# Patient Record
Sex: MALE | Race: WHITE | Employment: OTHER | URBAN - METROPOLITAN AREA
[De-identification: names, ages, dates, MRNs, and addresses within clinical notes are randomized per-mention and may not be internally consistent; named-entity substitution may affect disease eponyms.]

---

## 2017-11-16 PROBLEM — N28.1 COMPLEX RENAL CYST: Status: ACTIVE | Noted: 2017-11-16

## 2018-10-05 ENCOUNTER — HOSPITAL ENCOUNTER (OUTPATIENT)
Dept: PHYSICAL THERAPY | Age: 73
Discharge: HOME | End: 2018-10-05
Payer: MEDICARE

## 2018-10-05 PROCEDURE — 97140 MANUAL THERAPY 1/> REGIONS: CPT | Performed by: PHYSICAL THERAPIST

## 2018-10-05 PROCEDURE — G8979 MOBILITY GOAL STATUS: HCPCS | Performed by: PHYSICAL THERAPIST

## 2018-10-05 PROCEDURE — G8978 MOBILITY CURRENT STATUS: HCPCS | Performed by: PHYSICAL THERAPIST

## 2018-10-05 PROCEDURE — 97162 PT EVAL MOD COMPLEX 30 MIN: CPT | Performed by: PHYSICAL THERAPIST

## 2018-10-05 PROCEDURE — 97530 THERAPEUTIC ACTIVITIES: CPT | Performed by: PHYSICAL THERAPIST
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
## 2018-10-08 ENCOUNTER — HOSPITAL ENCOUNTER (OUTPATIENT)
Dept: PHYSICAL THERAPY | Age: 73
Discharge: HOME | End: 2018-10-08
Payer: MEDICARE

## 2018-10-08 PROCEDURE — 97110 THERAPEUTIC EXERCISES: CPT | Performed by: PHYSICAL THERAPIST

## 2018-10-08 PROCEDURE — 97140 MANUAL THERAPY 1/> REGIONS: CPT | Performed by: PHYSICAL THERAPIST

## 2018-10-08 NOTE — PROGRESS NOTES
PT DAILY TREATMENT NOTE     Patient Name: Janine Sánchez  Date:10/8/2018  : 1945  [x]  Patient  Verified  Payor: Colton Points / Plan: [place] MEDICARE PART B / Product Type: Medicare /    In time:1025am  Out time:1144am  Total Treatment Time (min): 79  Total Timed Codes (min): 69  1:1 Treatment Time (min): 33   Visit #: 2 of     Treatment Area:  Other cervical disc degeneration, unspecified cervical region [M50.30]  Other cervical disc degeneration, cervicothoracic region [M50.33]  Other intervertebral disc degeneration, lumbosacral region [M51.37]  Unspecified osteoarthritis, unspecified site [M19.90]    SUBJECTIVE  Pain Level (0-10 scale): 5  Any medication changes, allergies to medications, adverse drug reactions, diagnosis change, or new procedure performed?: [x] No    [] Yes (see summary sheet for update)  Subjective functional status/changes:   [x] No changes reported      OBJECTIVE  Modality rationale: decrease inflammation, decrease pain and increase tissue extensibility to improve the patients ability to perform functional mobility and improve activity  endurance     Min Type Additional Details    [] Estim: []Att   []Unatt        []TENS instruct                  []IFC  []Premod   []NMES                     []Other:  []w/US   []w/ice   []w/heat  Position:  Location:    []  Traction: [] Cervical       []Lumbar                       [] Prone          []Supine                       []Intermittent   []Continuous Lbs:  [] before manual  [] after manual    []  Ultrasound: []Continuous   [] Pulsed                           []1MHz   []3MHz Location:  W/cm2:    []  Iontophoresis with dexamethasone         Location: [] Take home patch   [] In clinic   10 []  Ice     [x]  heat  []  Ice massage Position:SL  Location: To R QL    []  Vasopneumatic Device Pressure:       [] lo [] med [] hi   Temperature: [] lo [] med [] hi   [] Skin assessment post-treatment:  []intact []redness- no adverse reaction []redness - adverse reaction:       59/23 min Therapeutic Exercise:  [] See flow sheet : initiate POC, fit for Heel lift    Rationale: increase ROM, increase strength, improve coordination and increase proprioception to improve the patients ability to perform functional mobility and improve activity  endurance        10 min Manual Therapy:  DTM and TRP R to R QL mm in SL   Rationale: decrease pain, increase ROM, increase tissue extensibility and decrease trigger points to perform functional mobility and improve activity  endurance     min Gait Training:  ___ feet with ___ device on level surfaces with ___ level of assist   Rationale:          x min Patient Education: [x] Review HEP    [] Progressed/Changed HEP based on:   [] positioning   [x] body mechanics   [] transfers   [] heat/ice application        Other Objective/Functional Measures:  initiated POC, pt given heel lift in L shoe and reports decreased pain with therex using lift. Good TA noted but challenged with PPT in seated positon. Pain Level (0-10 scale) post treatment: 2-3    ASSESSMENT/Changes in Function:   increased RB and time needed to complete therex. Pt has more normalized/level pelvis with heel lift in L shoe with improved gait pattern noted. Assess effects of heel lift NV. Pt instructed to gradually acclimate himself to lift if he is unable to tolerate wearing all day. Patient will continue to benefit from skilled PT services to modify and progress therapeutic interventions, address functional mobility deficits, address ROM deficits, address strength deficits, analyze and address soft tissue restrictions, analyze and cue movement patterns, analyze and modify body mechanics/ergonomics, assess and modify postural abnormalities, address imbalance/dizziness and instruct in home and community integration to attain remaining goals.      []  See Plan of Care  []  See progress note/recertification  []  See Discharge Summary         Progress towards goals / Updated goals:  First FU visit     PLAN  []  Upgrade activities as tolerated     []  Continue plan of care  []  Update interventions per flow sheet       []  Discharge due to:_  []  Other:_      Sony Swenson, CHAPIS 10/8/2018  7:40 AM

## 2018-10-11 ENCOUNTER — HOSPITAL ENCOUNTER (OUTPATIENT)
Dept: PHYSICAL THERAPY | Age: 73
Discharge: HOME | End: 2018-10-11
Payer: MEDICARE

## 2018-10-11 PROCEDURE — 97110 THERAPEUTIC EXERCISES: CPT

## 2018-10-11 PROCEDURE — 97140 MANUAL THERAPY 1/> REGIONS: CPT

## 2018-10-11 NOTE — PROGRESS NOTES
PT DAILY TREATMENT NOTE     Patient Name: Lio Quiroz  Date:10/11/2018  : 1945  [x]  Patient  Verified  Payor: Daija Diamond / Plan: [place] MEDICARE PART B / Product Type: Medicare /    In time:1015 am  Out time:1125 am  Total Treatment Time (min): 70  Total Timed Codes (min): 60  1:1 Treatment Time (min): 40   Visit #: 3 of     Treatment Area: Other cervical disc degeneration, unspecified cervical region [M50.30]  Other cervical disc degeneration, cervicothoracic region [M50.33]  Other intervertebral disc degeneration, lumbosacral region [M51.37]  Unspecified osteoarthritis, unspecified site [M19.90]    SUBJECTIVE  Pain Level (0-10 scale): 3-4/10  Any medication changes, allergies to medications, adverse drug reactions, diagnosis change, or new procedure performed?: [x] No    [] Yes (see summary sheet for update)  Subjective functional status/changes:   [x] No changes reported  The heel lift in my left shoe has really helped me.     OBJECTIVE  Modality rationale: decrease inflammation, decrease pain and increase tissue extensibility to improve the patients ability to perform functional mobility and improve activity  endurance     Min Type Additional Details    [] Estim: []Att   []Unatt        []TENS instruct                  []IFC  []Premod   []NMES                     []Other:  []w/US   []w/ice   []w/heat  Position:  Location:    []  Traction: [] Cervical       []Lumbar                       [] Prone          []Supine                       []Intermittent   []Continuous Lbs:  [] before manual  [] after manual    []  Ultrasound: []Continuous   [] Pulsed                           []1MHz   []3MHz Location:  W/cm2:    []  Iontophoresis with dexamethasone         Location: [] Take home patch   [] In clinic   10 []  Ice     [x]  heat  []  Ice massage Position:SL semirecumbent   Location: To R QL    []  Vasopneumatic Device Pressure:       [] lo [] med [] hi   Temperature: [] lo [] med [] hi   [x] Skin assessment post-treatment:  [x]intact []redness- no adverse reaction       []redness - adverse reaction:       50/30 min Therapeutic Exercise:  [x] See flow sheet : TM was supervised with VCs as it was pts first time    Rationale: increase ROM, increase strength, improve coordination and increase proprioception to improve the patients ability to perform functional mobility and improve activity  endurance        10 min Manual Therapy:  DTM and TRP R to R QL mm in SL semirecumbent    Rationale: decrease pain, increase ROM, increase tissue extensibility and decrease trigger points to perform functional mobility and improve activity  endurance     min Gait Training:  ___ feet with ___ device on level surfaces with ___ level of assist   Rationale:          x min Patient Education: [x] Review HEP    [] Progressed/Changed HEP based on:   [] positioning   [x] body mechanics   [] transfers   [] heat/ice application        Other Objective/Functional Measures: Added TM training  Added right hip swings in sagittal plane and right QL stretch on stairs    Added clam shells    Pain Level (0-10 scale) post treatment: 0    ASSESSMENT/Changes in Function:   Pt was only able to tolerate 2.5min on TM at . 6mph on 0 incline with VCs throughout and mild SOB which was recovered after seated rest break. Encouraged pt to up frequency of daily walks to mailbox from 1x a day to 2x daily and he was with verbalized understanding. Spoke with pt about using multiple pillows in bed for semi recumbent positioning as he sleeps in a recliner each night predisposing him to secondary postural impairments. Pt reluctant at first, but seemed more open to the idea after max education provided.     Patient will continue to benefit from skilled PT services to modify and progress therapeutic interventions, address functional mobility deficits, address ROM deficits, address strength deficits, analyze and address soft tissue restrictions, analyze and cue movement patterns, analyze and modify body mechanics/ergonomics, assess and modify postural abnormalities, address imbalance/dizziness and instruct in home and community integration to attain remaining goals. [x]  See Plan of Care  []  See progress note/recertification  []  See Discharge Summary         Progress towards goals / Updated goals: · Short Term Goals: To be accomplished in  1  weeks:  1. PT and pt will develop HEP for self-management of symptoms. -INITIATED 10/11/18     · Long Term Goals: To be accomplished in  10  treatments:  1. Pt will be able to walk for 15 min without pain as a functional indicator of improved activity tolerance . -INITIATED TM PROGRAM 2.5min only on . 6mph-10/11/18  2. Pt will improve core/lumbar strength to 4/5 to be able to perform full bridge to improve postural stability with ADLs  3. Pt will be IND and compliant with HEP for self-management as a functional indicator of improved mobility. 4. Pt will improve FOTO score to at least 47/100 as a functional indicator of improved mobility.      PLAN  []  Upgrade activities as tolerated     [x]  Continue plan of care  []  Update interventions per flow sheet       []  Discharge due to:_  []  Other:_      Shelli Dubose, PT 10/11/2018  7:40 AM

## 2018-10-15 ENCOUNTER — HOSPITAL ENCOUNTER (OUTPATIENT)
Dept: PHYSICAL THERAPY | Age: 73
Discharge: HOME | End: 2018-10-15
Payer: MEDICARE

## 2018-10-15 PROCEDURE — 97140 MANUAL THERAPY 1/> REGIONS: CPT | Performed by: PHYSICAL THERAPIST

## 2018-10-15 PROCEDURE — 97110 THERAPEUTIC EXERCISES: CPT | Performed by: PHYSICAL THERAPIST

## 2018-10-15 NOTE — PROGRESS NOTES
PT DAILY TREATMENT NOTE     Patient Name: Janine Sánchez  Date:10/15/2018  : 1945  [x]  Patient  Verified  Payor: Colton Points / Plan: [place] MEDICARE PART B / Product Type: Medicare /    In time:1230 pm  Out time:140 pm  Total Treatment Time (min):70   Total Timed Codes (min): 60  1:1 Treatment Time (min): 45  Visit #: 4 of     Treatment Area: Other cervical disc degeneration, unspecified cervical region [M50.30]  Other cervical disc degeneration, cervicothoracic region [M50.33]  Other intervertebral disc degeneration, lumbosacral region [M51.37]  Unspecified osteoarthritis, unspecified site [M19.90]    SUBJECTIVE  Pain Level (0-10 scale): 3-410  Any medication changes, allergies to medications, adverse drug reactions, diagnosis change, or new procedure performed?: [x] No    [] Yes (see summary sheet for update)  Subjective functional status/changes:   [x] No changes reported   I need to figure out how to keep the heel lift in my slipper. It works very well.      OBJECTIVE  Modality rationale: decrease inflammation, decrease pain and increase tissue extensibility to improve the patients ability to perform functional mobility and improve activity  endurance     Min Type Additional Details    [] Estim: []Att   []Unatt        []TENS instruct                  []IFC  []Premod   []NMES                     []Other:  []w/US   []w/ice   []w/heat  Position:  Location:    []  Traction: [] Cervical       []Lumbar                       [] Prone          []Supine                       []Intermittent   []Continuous Lbs:  [] before manual  [] after manual    []  Ultrasound: []Continuous   [] Pulsed                           []1MHz   []3MHz Location:  W/cm2:    []  Iontophoresis with dexamethasone         Location: [] Take home patch   [] In clinic   10 []  Ice     [x]  heat  []  Ice massage Position:SL semirecumbent   Location: To R QL    []  Vasopneumatic Device Pressure:       [] lo [] med [] hi   Temperature: [] lo [] med [] hi   [x] Skin assessment post-treatment:  [x]intact []redness- no adverse reaction       []redness - adverse reaction:       45/30 min Therapeutic Exercise:  [x] See flow sheet :    Rationale: increase ROM, increase strength, improve coordination and increase proprioception to improve the patients ability to perform functional mobility and improve activity  endurance        15 min Manual Therapy:  DTM and TRP R l/s ps  And to R QL mm in SL    Rationale: decrease pain, increase ROM, increase tissue extensibility and decrease trigger points to perform functional mobility and improve activity  endurance     min Gait Training:  ___ feet with ___ device on level surfaces with ___ level of assist   Rationale:          x min Patient Education: [x] Review HEP    [] Progressed/Changed HEP based on:   [] positioning   [x] body mechanics   [] transfers   [] heat/ice application        Other Objective/Functional Measures:  VC for correct form with QL stretch, no addition to program due to mod fatigue and not feeling well today. SPO2 97%, 82 Bpm    Pain Level (0-10 scale) post treatment: 2    ASSESSMENT/Changes in Function:   Pt had moderate fatigue today and reports he is not feeling up to par. Decreased some therex today and focused on MT DTM of QL mm and TrP. Pt given a new heel lift for use in slippers at home as he reports it really helps his back pain. .       Patient will continue to benefit from skilled PT services to modify and progress therapeutic interventions, address functional mobility deficits, address ROM deficits, address strength deficits, analyze and address soft tissue restrictions, analyze and cue movement patterns, analyze and modify body mechanics/ergonomics, assess and modify postural abnormalities, address imbalance/dizziness and instruct in home and community integration to attain remaining goals.      [x]  See Plan of Care  []  See progress note/recertification  []  See Discharge Summary Progress towards goals / Updated goals: · Short Term Goals: To be accomplished in  1  weeks:  1. PT and pt will develop HEP for self-management of symptoms. -INITIATED 10/11/18     · Long Term Goals: To be accomplished in  10  treatments:  1. Pt will be able to walk for 15 min without pain as a functional indicator of improved activity tolerance . -INITIATED TM PROGRAM 2.5min only on . 6mph-10/11/18  2. Pt will improve core/lumbar strength to 4/5 to be able to perform full bridge to improve postural stability with ADLs  3. Pt will be IND and compliant with HEP for self-management as a functional indicator of improved mobility. Progressing mod compliance 10/15/18  4. Pt will improve FOTO score to at least 47/100 as a functional indicator of improved mobility.      PLAN  []  Upgrade activities as tolerated     [x]  Continue plan of care  []  Update interventions per flow sheet       []  Discharge due to:_  []  Other:_      Ernestina Chapman, PT 10/15/2018  7:40 AM

## 2018-10-18 ENCOUNTER — HOSPITAL ENCOUNTER (OUTPATIENT)
Dept: PHYSICAL THERAPY | Age: 73
Discharge: HOME | End: 2018-10-18
Payer: MEDICARE

## 2018-10-18 PROCEDURE — 97110 THERAPEUTIC EXERCISES: CPT | Performed by: PHYSICAL THERAPIST

## 2018-10-18 PROCEDURE — 97140 MANUAL THERAPY 1/> REGIONS: CPT | Performed by: PHYSICAL THERAPIST

## 2018-10-18 NOTE — PROGRESS NOTES
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 I don't feel as out of breath today.     OBJECTIVE  Modality rationale: decrease inflammation, decrease pain and increase tissue extensibility to improve the patients ability to perform functional mobility and improve activity  endurance     Min Type Additional Details    [] Estim: []Att   []Unatt        []TENS instruct                  []IFC  []Premod   []NMES                     []Other:  []w/US   []w/ice   []w/heat  Position:  Location:    []  Traction: [] Cervical       []Lumbar                       [] Prone          []Supine                       []Intermittent   []Continuous Lbs:  [] before manual  [] after manual    []  Ultrasound: []Continuous   [] Pulsed                           []1MHz   []3MHz Location:  W/cm2:    []  Iontophoresis with dexamethasone         Location: [] Take home patch   [] In clinic   10 []  Ice     [x]  heat  []  Ice massage Position:SL  Location: To R QL    []  Vasopneumatic Device Pressure:       [] lo [] med [] hi   Temperature: [] lo [] med [] hi   [x] Skin assessment post-treatment:  [x]intact []redness- no adverse reaction       []redness - adverse reaction:       65/55 min Therapeutic Exercise:  [x] See flow sheet :      Rationale: increase ROM, increase strength, improve coordination and increase proprioception to improve the patients ability to perform functional mobility and improve activity  endurance        15 min Manual Therapy:  DTM and TRP R l/s ps  And to R QL mm in SL    Rationale: decrease pain, increase ROM, increase tissue extensibility and decrease trigger points to perform functional mobility and improve activity  endurance            x min Patient Education: [x] Review HEP    [] Progressed/Changed HEP based on:   [] positioning   [x] body mechanics   [] transfers   [] heat/ice application        Other Objective/Functional Measures:    continued with TM endurance training: .5mi/hr with cues for upright posture and step length. Added sit to stands from slightly elevated surface for stands without UE A (mod cueing needed for form)    Pain Level (0-10 scale) post treatment: 0    ASSESSMENT/Changes in Function:   Pt continues with sx related to upper l/s degenerative changes, noted TrP in R QL which responds well to TrPR and DTM with MHP. Progressed endurance training and LE strength therex today with good tolerance and good pulse and Spo2 following treatment. Patient will continue to benefit from skilled PT services to modify and progress therapeutic interventions, address functional mobility deficits, address ROM deficits, address strength deficits, analyze and address soft tissue restrictions, analyze and cue movement patterns, analyze and modify body mechanics/ergonomics, assess and modify postural abnormalities, address imbalance/dizziness and instruct in home and community integration to attain remaining goals.      [x]  See Plan of Care  []  See progress note/recertification  []  See Discharge Summary         Progress towards goals / Updated goals: · Short Term Goals: To be accomplished in  1  weeks:  1. PT and pt will develop HEP for self-management of symptoms. -INITIATED 10/11/18     · Long Term Goals: To be accomplished in  10  treatments:  1. Pt will be able to walk for 15 min without pain as a functional indicator of improved activity tolerance . TM PROGRAM 5 min only on . 5mph-10/18/18  2. Pt will improve core/lumbar strength to 4/5 to be able to perform full bridge to improve postural stability with ADLs  3. Pt will be IND and compliant with HEP for self-management as a functional indicator of improved mobility. Progressing mod compliance 10/15/18  4. Pt will improve FOTO score to at least 47/100 as a functional indicator of improved mobility.      PLAN  []  Upgrade activities as tolerated     [x]  Continue plan of care  []  Update interventions per flow sheet       []  Discharge due to:_  []  Other:_      Saul Galeazzi, PT 10/18/2018  7:40 AM

## 2018-10-22 ENCOUNTER — HOSPITAL ENCOUNTER (OUTPATIENT)
Dept: PHYSICAL THERAPY | Age: 73
Discharge: HOME | End: 2018-10-22
Payer: MEDICARE

## 2018-10-22 PROCEDURE — 97140 MANUAL THERAPY 1/> REGIONS: CPT | Performed by: PHYSICAL THERAPIST

## 2018-10-22 PROCEDURE — 97110 THERAPEUTIC EXERCISES: CPT | Performed by: PHYSICAL THERAPIST

## 2018-10-22 NOTE — PROGRESS NOTES
PT DAILY TREATMENT NOTE     Patient Name: Levi Steele  Date:10/22/2018  : 1945  [x]  Patient  Verified  Payor: Trinidad Martínez / Plan: [place] B / Product Type: Medicare /    In time:1230 am  Out time: 140 pm  Total Treatment Time (min):70   Total Timed Codes (min): 55  1:1 Treatment Time (min): 40  Visit #: 6 of     Treatment Area: Other cervical disc degeneration, unspecified cervical region [M50.30]  Other cervical disc degeneration, cervicothoracic region [M50.33]  Other intervertebral disc degeneration, lumbosacral region [M51.37]  Unspecified osteoarthritis, unspecified site [M19.90]    SUBJECTIVE  Pain Level (0-10 scale): 410  Any medication changes, allergies to medications, adverse drug reactions, diagnosis change, or new procedure performed?: [x] No    [] Yes (see summary sheet for update)  Subjective functional status/changes:   [x] No changes reported  I am just not feeling well today. I am having pain on the R when I put weight on the R side.     OBJECTIVE  Modality rationale: decrease inflammation, decrease pain and increase tissue extensibility to improve the patients ability to perform functional mobility and improve activity  endurance     Min Type Additional Details    [] Estim: []Att   []Unatt        []TENS instruct                  []IFC  []Premod   []NMES                     []Other:  []w/US   []w/ice   []w/heat  Position:  Location:    []  Traction: [] Cervical       []Lumbar                       [] Prone          []Supine                       []Intermittent   []Continuous Lbs:  [] before manual  [] after manual    []  Ultrasound: []Continuous   [] Pulsed                           []1MHz   []3MHz Location:  W/cm2:    []  Iontophoresis with dexamethasone         Location: [] Take home patch   [] In clinic   10 []  Ice     [x]  heat  []  Ice massage Position:SL  Location: To R QL    []  Vasopneumatic Device Pressure:       [] lo [] med [] hi   Temperature: [] lo [] med [] hi   [x] Skin assessment post-treatment:  [x]intact []redness- no adverse reaction       []redness - adverse reaction:       45/25 min Therapeutic Exercise:  [x] See flow sheet :      Rationale: increase ROM, increase strength, improve coordination and increase proprioception to improve the patients ability to perform functional mobility and improve activity  endurance        15  min Manual Therapy:  DTM and TRP R l/s ps  And to R QL mm in SL    Rationale: decrease pain, increase ROM, increase tissue extensibility and decrease trigger points to perform functional mobility and improve activity  endurance            x min Patient Education: [x] Review HEP    [] Progressed/Changed HEP based on:   [] positioning   [x] body mechanics   [] transfers   [] heat/ice application        Other Objective/Functional Measures:  Decreased endurance for therex today with c/o R l/s pain with full WB on R LE  continued with TM endurance training: .5mi/hr but decreased tolerance today to 2 min. Progress to table HL next visit for ball bands for eventual progression to bridges    Pain Level (0-10 scale) post treatment: 2-3    ASSESSMENT/Changes in Function:   Presents with decreased endurance compared to last visit. Pt continues sx into the R QL mm with noted TTP/TRP of QL. Pt reports good relief from e-stim in past at Chiropractor, look at possibly adding in E-will at next treatment. Performing HEP sit to stands over the weekend with good compliance per pt report.        Patient will continue to benefit from skilled PT services to modify and progress therapeutic interventions, address functional mobility deficits, address ROM deficits, address strength deficits, analyze and address soft tissue restrictions, analyze and cue movement patterns, analyze and modify body mechanics/ergonomics, assess and modify postural abnormalities, address imbalance/dizziness and instruct in home and community integration to attain remaining goals. [x]  See Plan of Care  []  See progress note/recertification  []  See Discharge Summary         Progress towards goals / Updated goals: · Short Term Goals: To be accomplished in  1  weeks:  1. PT and pt will develop HEP for self-management of symptoms. -INITIATED 10/11/18     · Long Term Goals: To be accomplished in  10  treatments:  1. Pt will be able to walk for 15 min without pain as a functional indicator of improved activity tolerance . TM PROGRAM 5 min only on . 5mph-10/18/18  2. Pt will improve core/lumbar strength to 4/5 to be able to perform full bridge to improve postural stability with ADLs  3. Pt will be IND and compliant with HEP for self-management as a functional indicator of improved mobility. Progressing mod compliance 10/22/18  4. Pt will improve FOTO score to at least 47/100 as a functional indicator of improved mobility.      PLAN  []  Upgrade activities as tolerated     [x]  Continue plan of care  []  Update interventions per flow sheet       []  Discharge due to:_  []  Other:_      Ernestina Chapman, PT 10/22/2018  7:40 AM

## 2018-10-25 ENCOUNTER — HOSPITAL ENCOUNTER (OUTPATIENT)
Dept: PHYSICAL THERAPY | Age: 73
Discharge: HOME | End: 2018-10-25
Payer: MEDICARE

## 2018-10-25 PROCEDURE — 97110 THERAPEUTIC EXERCISES: CPT

## 2018-10-25 PROCEDURE — 97140 MANUAL THERAPY 1/> REGIONS: CPT

## 2018-10-25 NOTE — PROGRESS NOTES
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.37]  Unspecified osteoarthritis, unspecified site [M19.90]    SUBJECTIVE  Pain Level (0-10 scale): 0/10    Any medication changes, allergies to medications, adverse drug reactions, diagnosis change, or new procedure performed?: [x] No    [] Yes (see summary sheet for update)  Subjective functional status/changes:   [] No changes reported  Im feeling much better than when I started with the therapy    OBJECTIVE  Modality rationale: decrease pain and increase tissue extensibility to improve the patients ability to improve functional tolerance    Type Additional Details   [] Estim: []Att   []Unatt  []TENS instruct                 []IFC  []Premod []NMES                       []Other:  []w/US   []w/ice   []w/heat  Position:  Location:   []  Traction: [] Cervical       []Lumbar                       [] Prone          []Supine                       []Intermittent   []Continuous Lbs:  [] before manual  [] after manual   []  Ultrasound: []Continuous   [] Pulsed                           []1MHz   []3MHz Location:  W/cm2:   []  Iontophoresis with dexamethasone         Location: [] Take home patch   [] In clinic   []  Ice     [x]  Heat  15 minutes.    []  Ice massage Position:LEft sidelying   Location:Right side QL and lumbar spine    []  Vasopneumatic Device Pressure: [] lo [] med [] hi   Temp: [] lo [] med [] hi   [x] Skin assessment post-treatment:  []intact []redness- no adverse reaction       []redness - adverse reaction:       50/45 min Therapeutic Exercise:  [x] See flow sheet : 30 min 1:1   Rationale: increase strength to improve the patients ability to improve tolerance to standing       20 min Manual Therapy:  DTM along LS spine and Right QL. TrP release    Rationale: decrease pain, increase ROM and increase tissue extensibility to improve tolerance to standing      min Patient Education: [x] Review HEP    [] Progressed/Changed HEP based on:   [] positioning   [] body mechanics   [] transfers   [] heat/ice application        Other Objective/Functional Measures:   Continued palpable TrP in Right QL. Decreased VCs needed for therex. Pain Level (0-10 scale) post treatment:  0/10    ASSESSMENT/Changes in Function:   Patient is progressing with improved tolerance to activity, decreased reports of pain and improved form and control with therex. Patient will continue to benefit from skilled PT services to modify and progress therapeutic interventions, address functional mobility deficits, address strength deficits and analyze and address soft tissue restrictions to attain remaining goals. []  See Plan of Care  []  See progress note/recertification  []  See Discharge Summary         Progress towards goals / Updated goals: · Short Term Goals: To be accomplished in  1  weeks:  1. PT and pt will develop HEP for self-management of symptoms. -INITIATED 10/11/18     · Long Term Goals: To be accomplished in  10  treatments:  1. Pt will be able to walk for 15 min without pain as a functional indicator of improved activity tolerance . TM PROGRAM 5 min only on . 5mph-10/18/18  2. Pt will improve core/lumbar strength to 4/5 to be able to perform full bridge to improve postural stability with ADLs  3. Pt will be IND and compliant with HEP for self-management as a functional indicator of improved mobility. Progressing mod compliance 10/22/18  4.  Pt will improve FOTO score to at least 47/100 as a functional indicator of improved mobility.      PLAN  [x]  Upgrade activities as tolerated     []  Continue plan of care  []  Update interventions per flow sheet       []  Discharge due to:_  []  Other:_      Yi Spaulding, CHAPIS 10/25/2018  12:39 PM

## 2018-10-29 ENCOUNTER — HOSPITAL ENCOUNTER (OUTPATIENT)
Dept: PHYSICAL THERAPY | Age: 73
Discharge: HOME | End: 2018-10-29
Payer: MEDICARE

## 2018-10-29 PROCEDURE — 97110 THERAPEUTIC EXERCISES: CPT | Performed by: PHYSICAL THERAPIST

## 2018-10-29 PROCEDURE — 97140 MANUAL THERAPY 1/> REGIONS: CPT | Performed by: PHYSICAL THERAPIST

## 2018-10-29 NOTE — PROGRESS NOTES
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     OBJECTIVE  Modality rationale: decrease inflammation, decrease pain and increase tissue extensibility to improve the patients ability to perform functional mobility and improve activity  endurance     Min Type Additional Details    [] Estim: []Att   []Unatt  []TENS instruct                 []IFC  []Premod []NMES                       []Other:  []w/US   []w/ice   []w/heat  Position:  Location:    []  Traction: [] Cervical       []Lumbar                       [] Prone          []Supine                       []Intermittent   []Continuous Lbs:  [] before manual  [] after manual    []  Ultrasound: []Continuous   [] Pulsed                           []1MHz   []3MHz Location:  W/cm2:    []  Iontophoresis with dexamethasone         Location: [] Take home patch   [] In clinic   10 []  Ice     [x]  heat  []  Ice massage Position:SL   Location:QL mm    []  Vasopneumatic Device Pressure: [] lo [] med [] hi   Temp: [] lo [] med [] hi   [] Skin assessment post-treatment:  []intact []redness- no adverse reaction       []redness - adverse reaction:         80/50 min Therapeutic Exercise:  [] See flow sheet :   Rationale: increase strength to improve the patients ability to improve tolerance to standing       10 min Manual Therapy:  DTM along LS spine and QL. TrP release    Rationale: decrease pain, increase ROM and increase tissue extensibility to improve tolerance to standing     x min Patient Education: [x] Review HEP    [] Progressed/Changed HEP based on:   [] positioning   [] body mechanics   [] transfers   [] heat/ice application        Other Objective/Functional Measures:  Pt continues to present with decreased endurance and antalgic, fwd flexed gait today. Pt continues to be motivated to perform therapy, however. Pt able to increase tm time to 2.5 min today with mod SOB. SP02:97%   Pt requires increased time to perform all therex. Try to progress to HL ball bands, and LTR to stretch QL mm. Pain Level (0-10 scale) post treatment: 1    ASSESSMENT/Changes in Function:   Continues with QL TrP and increased overall body aches over the last couple of days. Pt reports he hasn't changed any medications or performed any new tasks, however when questioned further pt reports he fell getting into his car over the weekend. Patient will continue to benefit from skilled PT services to modify and progress therapeutic interventions, address functional mobility deficits, address strength deficits and analyze and address soft tissue restrictions to attain remaining goals. []  See Plan of Care  []  See progress note/recertification  []  See Discharge Summary       Progress towards goals / Updated goals: · Short Term Goals: To be accomplished in  1  weeks:  1. PT and pt will develop HEP for self-management of symptoms. -INITIATED 10/11/18     · Long Term Goals: To be accomplished in  10  treatments:  1.  Pt will be able to walk for 15 min without pain as a functional indicator of improved activity tolerance . TM PROGRAM 5 min only on . 5mph-10/18/18  2. Pt will improve core/lumbar strength to 4/5 to be able to perform full bridge to improve postural stability with ADLs  3. Pt will be IND and compliant with HEP for self-management as a functional indicator of improved mobility. Progressing mod compliance 10/22/18  4. Pt will improve FOTO score to at least 47/100 as a functional indicator of improved mobility.          PLAN  [x]  Upgrade activities as tolerated     []  Continue plan of care  []  Update interventions per flow sheet       []  Discharge due to:_  [x]  Other:_      Saul Galeazzi, PT 10/29/2018  12:39 PM

## 2018-11-01 ENCOUNTER — HOSPITAL ENCOUNTER (OUTPATIENT)
Dept: PHYSICAL THERAPY | Age: 73
Discharge: HOME | End: 2018-11-01
Payer: MEDICARE

## 2018-11-01 PROCEDURE — 97110 THERAPEUTIC EXERCISES: CPT | Performed by: PHYSICAL THERAPIST

## 2018-11-05 ENCOUNTER — HOSPITAL ENCOUNTER (OUTPATIENT)
Dept: PHYSICAL THERAPY | Age: 73
Discharge: HOME | End: 2018-11-05
Payer: MEDICARE

## 2018-11-05 PROCEDURE — G8978 MOBILITY CURRENT STATUS: HCPCS | Performed by: PHYSICAL THERAPIST

## 2018-11-05 PROCEDURE — G8979 MOBILITY GOAL STATUS: HCPCS | Performed by: PHYSICAL THERAPIST

## 2018-11-05 PROCEDURE — 97110 THERAPEUTIC EXERCISES: CPT | Performed by: PHYSICAL THERAPIST

## 2018-11-05 NOTE — PROGRESS NOTES
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.37]  Unspecified osteoarthritis, unspecified site [M19.90]    SUBJECTIVE  Pain Level (0-10 scale): 4/10    Any medication changes, allergies to medications, adverse drug reactions, diagnosis change, or new procedure performed?: [x] No    [] Yes (see summary sheet for update)  Subjective functional status/changes:   [] No changes reported  See PN    OBJECTIVE  Modality rationale: decrease inflammation, decrease pain and increase tissue extensibility to improve the patients ability to perform functional mobility and improve activity  endurance     Min Type Additional Details    [] Estim: []Att   []Unatt  []TENS instruct                 []IFC  []Premod []NMES                       []Other:  []w/US   []w/ice   []w/heat  Position:  Location:    []  Traction: [] Cervical       []Lumbar                       [] Prone          []Supine                       []Intermittent   []Continuous Lbs:  [] before manual  [] after manual    []  Ultrasound: []Continuous   [] Pulsed                           []1MHz   []3MHz Location:  W/cm2:    []  Iontophoresis with dexamethasone         Location: [] Take home patch   [] In clinic   10 []  Ice     [x]  heat  []  Ice massage Position:SL   Location:QL mm    []  Vasopneumatic Device Pressure: [] lo [] med [] hi   Temp: [] lo [] med [] hi   [] Skin assessment post-treatment:  []intact []redness- no adverse reaction       []redness - adverse reaction:         43/30 min Therapeutic Exercise:  [] See flow sheet : FOTO and reassessment   Rationale: increase strength to improve the patients ability to improve tolerance to standing       H min Manual Therapy:  DTM along LS spine and QL. TrP release    Rationale: decrease pain, increase ROM and increase tissue extensibility to improve tolerance to standing     x min Patient Education: [x] Review HEP    [] Progressed/Changed HEP based on: strength deficits[] positioning   [x] body mechanics   [] transfers   [] heat/ice application        Other Objective/Functional Measures:    Subjective: Patient reports 40% improvements in sx since [facility]. Pain levels range from 4 to 5. Patient's current complaints: \"I have good days and bad days I can't  my feet good to walk. When I turn in bed, I can get a sharp pain in R hip area. I still use my cane to walk. \" . Patient performing HEP every other day. Objective: sit to stand from 19.5 in surface with UE on Knees with good eccentric lower. Bed transfers: Indep slow, bridge <50% , Log roll with min A. For supine to sit on mat table. Tm walking tolerance: 5 min at .5 mph. Hip flexion strength: 4-. Ambulates with decreased raheem, stride length, and heel toe. Improvements: \"at times I can walk with my cane and it doesn't hurt. I can stand longer than I used to: 10 min tolerance. I can walk a lot further than I could a month ago, still use the cane though. Deficits: recurrent pain varies in intensity. Standing and walking tolerance, arising from low surfaces, activity endurance. FOTO: 36/100 remained the same from IE    · Short Term Goals: To be accomplished in  1  weeks:  1. PT and pt will develop HEP for self-management of symptoms. -INITIATED 10/11/18     · Long Term Goals: To be accomplished in  10  treatments:  1.  Pt will be able to walk for 15 min without AD or pain as a functional indicator of improved activity tolerance . TM PROGRAM 5 min only on .5mph with UE A. Pt using cane at all times-11/5/18  2. Pt will improve core/lumbar strength to 4/5 to be able to perform full bridge to improve postural stability with ADLs. Progressing <50% bridge, hip flexion: 4-/5.  11/5/18  3. Pt will be IND and compliant with HEP for self-management as a functional indicator of improved mobility. Progressing mod compliance, every other day. 11/5/18  4. Pt will improve FOTO score to at least 47/100 as a functional indicator of improved mobility. 36/100 same as IE 11/5/18    New goals; as listed above with addition of     5. Pt able to perform 8 sit to stands ( no UE A) from 19.5in with good form indicating improved LE  Strength for functional transfers. Pain Level (0-10 scale) post treatment: 3    ASSESSMENT/Changes in Function:   See PN   Patient will continue to benefit from skilled PT services to modify and progress therapeutic interventions, address functional mobility deficits, address strength deficits and analyze and address soft tissue restrictions to attain remaining goals.        PLAN  [x]  Upgrade activities as tolerated     [x]  Continue plan of care  []  Update interventions per flow sheet       []  Discharge due to:_  [x]  Other:continue 2x/wk for 4 weeks    Landon Tom, PT 11/5/2018  316  PM

## 2018-11-05 NOTE — PROGRESS NOTES
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 Treatment consist of therapeutic exercise including ROM, stretching,  strengthening, stabilization training, postural ed, patient education, HEP instruction, MHP, and Manual therapy and heel lift. CURRENT STATUS  Subjective: Patient reports 40% improvements in sx since [facility]. Pain levels range from 4 to 5. Patient's current complaints: \"I have good days and bad days I can't  my feet good to walk. When I turn in bed, I can get a sharp pain in R hip area. I still use my cane to walk. \" . Patient performing HEP every other day. Objective: sit to stand from 19.5 in surface with UE on Knees with good eccentric lower. Bed transfers: Indep slow, bridge <50% , Log roll with min A. For supine to sit on mat table. Tm walking tolerance: 5 min at .5 mph. Hip flexion strength: 4-. Ambulates with decreased raheem, stride length, and heel toe. Improvements: \"at times I can walk with my cane and it doesn't hurt. I can stand longer than I used to: 10 min tolerance. I can walk a lot further than I could a month ago, still use the cane though. Deficits: recurrent pain varies in intensity. Standing and walking tolerance, arising from low surfaces, activity endurance. FOTO: 36/100 remained the same from IE     · Short Term Goals:  To be accomplished in  1  weeks:  1. PT and pt will develop HEP for self-management of symptoms. -INITIATED 10/11/18     · Long Term Goals: To be accomplished in  10  treatments:  1. Pt will be able to walk for 15 min without AD or pain as a functional indicator of improved activity tolerance . TM PROGRAM 5 min only on . 5mph with UE A. Pt using cane at all times-11/5/18  2. Pt will improve core/lumbar strength to 4/5 to be able to perform full bridge to improve postural stability with ADLs. Progressing <50% bridge, hip flexion: 4-/5.  11/5/18  3. Pt will be IND and compliant with HEP for self-management as a functional indicator of improved mobility. Progressing mod compliance, every other day. 11/5/18  4. Pt will improve FOTO score to at least 47/100 as a functional indicator of improved mobility.  36/100 same as IE 11/5/18     New goals; as listed above with addition of      5. Pt able to perform 8 sit to stands ( no UE A) from 19.5in with good form indicating improved LE  Strength for functional transfers. G-Codes: Mobility: [id number] Current  CL= 60-79%   Goal  CK= 40-59%. The severity rating is based on the FOTO Score  RECOMMENDATIONS  Pt to continue 2x/wk for 4 wks to address above listed goals. If you have any questions/comments please contact us directly at [phone]. Thank you for allowing us to assist in the care of your patient. Therapist Signature: Vera Hall PT Date: 11/5/2018   Reporting period 10/5/18 to 11/5/18 Time: 12:17 PM   NOTE TO PHYSICIAN:  PLEASE COMPLETE THE ORDERS BELOW AND FAX TO   [facility] [place]: [phone].   If you are unable to process this request in 24 hours please contact our office: [phone].    ___ I have read the above report and request that my patient continue as recommended.   ___ I have read the above report and request that my patient continue therapy with the following changes/special instructions:_________________________________________________________   ___ I have read the above report and request that my patient be discharged from therapy.      Physician Signature:        Date:       Time:

## 2018-11-07 ENCOUNTER — HOSPITAL ENCOUNTER (OUTPATIENT)
Dept: PHYSICAL THERAPY | Age: 73
Discharge: HOME | End: 2018-11-07
Payer: MEDICARE

## 2018-11-07 PROCEDURE — 97140 MANUAL THERAPY 1/> REGIONS: CPT

## 2018-11-07 PROCEDURE — 97110 THERAPEUTIC EXERCISES: CPT

## 2018-11-07 PROCEDURE — 97110 THERAPEUTIC EXERCISES: CPT | Performed by: PHYSICAL THERAPIST

## 2018-11-07 PROCEDURE — 97140 MANUAL THERAPY 1/> REGIONS: CPT | Performed by: PHYSICAL THERAPIST

## 2018-11-07 NOTE — PROGRESS NOTES
PT DAILY TREATMENT NOTE - [facility] 3-16    Patient Name: Ursula Acosta  Date:2018  : 1945  [x]  Patient  Verified  Payor: Primo Mena / Plan: [place] MEDICARE PART B / Product Type: Medicare /    In time:1200  Out time:115  Total Treatment Time (min): 75  Total Timed Codes (min): 60  1:1 Treatment Time ([facility] only): 60   Visit #: 1 of 10    Treatment Area: Other cervical disc degeneration, unspecified cervical region [M50.30]  Other cervical disc degeneration, cervicothoracic region [M50.33]  Other intervertebral disc degeneration, lumbosacral region [M51.37]  Unspecified osteoarthritis, unspecified site [M19.90]    SUBJECTIVE  Pain Level (0-10 scale): 2-3/10  Any medication changes, allergies to medications, adverse drug reactions, diagnosis change, or new procedure performed?: [x] No    [] Yes (see summary sheet for update)  Subjective functional status/changes:   [x] No changes reported  Patient reports increased leg pain upon arrival. When told he was getting MT this session, the patient stated \"thank you because I really needed it. \"    OBJECTIVE    Modality rationale: decrease pain and increase tissue extensibility to improve the patients ability to ambulate without pain.    Type Additional Details   [] Estim:  []Unatt       []IFC  []Premod                        []Other:  []w/ice   []w/heat  Position:  Location:   [] Estim: []Att    []TENS instruct  []NMES                    []Other:  []w/US   []w/ice   []w/heat  Position:  Location:   []  Traction: [] Cervical       []Lumbar                       [] Prone          []Supine                       []Intermittent   []Continuous Lbs:  [] before manual  [] after manual   []  Ultrasound: []Continuous   [] Pulsed                           []1MHz   []3MHz Location:  W/cm2:   []  Iontophoresis with dexamethasone         Location: [] Take home patch   [] In clinic   +5 setup[]  Ice     [x]  heat  []  Ice massage  []  Laser   []  Anodyne Position: Sidelying on L  Location: R side low back   []  Laser with stim  []  Other: Position:  Location:   []  Vasopneumatic Device Pressure:       [] lo [] med [] hi   Temperature: [] lo [] med [] hi   [x] Skin assessment post-treatment:  [x]intact []redness- no adverse reaction    []redness - adverse reaction:    50 min Therapeutic Exercise:  [x] See flow sheet :   Rationale: increase strength and improve balance to improve the patients ability to ambulate with a normalized gait pattern. 10 min Manual Therapy: Technique:      [x] S/DTM []IASTM []PROM [] Passive Stretching   [] Graston:  [] GT 1  [] GT 2 [] GT 3 [] GT 4 [] GT 4 [] GT 5  [] GT 6  [] Sweep [] Fan [] Staley  [] Brush   []  Swivel []J- Stroke [] Scoop []IFraming     []Manual TPR  [] TDN (see objective; actual needle insertion time not billed)  []Jt manipulation:Gr I [] II []  III [] IV[] V[]  Treatment/Area:  S/DTM to R QL in sidelying   Rationale:      decrease pain, increase ROM, increase tissue extensibility and decrease trigger points to improve patient's ability to ambulate with a functional gait pattern            With   [] TE   [] TA   [] neuro   [] other: Patient Education: [x] Review HEP    [] Progressed/Changed HEP based on:   [] positioning   [] body mechanics   [] transfers   [] heat/ice application    [] Graston Education: Explained the effects and benefits of Graston Technique therapy including potential for post treatment soreness and bruising. [] Other:      Other Objective/Functional Measures: Tight R QL noted during STM to R QL. Pain Level (0-10 scale) post treatment: 2/10    ASSESSMENT/Changes in Function: Patient demonstrated decrease endurance this session, noted by the lack of exercises completed on the POC. He reported dizziness when transitioning from side lying to standing. Dizziness subsided within 30 seconds of standing still.       Patient will continue to benefit from skilled PT services to modify and progress therapeutic interventions, address functional mobility deficits, address ROM deficits, address strength deficits and analyze and address soft tissue restrictions to attain remaining goals. Progress towards goals / Updated goals:  1. PT and pt will develop HEP for self-management of symptoms. -INITIATED 10/11/18     · Long Term Goals: To be accomplished in  10  treatments:  1. Pt will be able to walk for 15 min without AD or pain as a functional indicator of improved activity tolerance . TM PROGRAM 5 min only on . 5mph with UE A. Pt using cane at all times-11/5/18  2. Pt will improve core/lumbar strength to 4/5 to be able to perform full bridge to improve postural stability with ADLs. Progressing <50% bridge, hip flexion: 4-/5.  11/5/18  3. Pt will be IND and compliant with HEP for self-management as a functional indicator of improved mobility. Progressing mod compliance, every other day. 11/5/18  4. Pt will improve FOTO score to at least 47/100 as a functional indicator of improved mobility.  36/100 same as IE 11/5/18  5. Pt able to perform 8 sit to stands ( no UE A) from 19.5in with good form indicating improved LE  Strength for functional transfers.         PLAN  [x]  Upgrade activities as tolerated     [x]  Continue plan of care  []  Update interventions per flow sheet       []  Discharge due to:_  [x]  Other: Check goals next visit      BONG Ram 11/7/2018  12:55 PM

## 2018-11-14 PROBLEM — R29.90 STROKE-LIKE SYMPTOM: Status: ACTIVE | Noted: 2018-11-14

## 2018-11-14 PROBLEM — G45.0 VERTEBRAL ARTERY INSUFFICIENCY: Status: ACTIVE | Noted: 2018-11-14

## 2018-11-15 ENCOUNTER — APPOINTMENT (OUTPATIENT)
Dept: PHYSICAL THERAPY | Age: 73
End: 2018-11-15
Payer: MEDICARE

## 2018-11-20 ENCOUNTER — APPOINTMENT (OUTPATIENT)
Dept: PHYSICAL THERAPY | Age: 73
End: 2018-11-20
Payer: MEDICARE

## 2018-11-21 ENCOUNTER — HOSPITAL ENCOUNTER (OUTPATIENT)
Dept: PHYSICAL THERAPY | Age: 73
End: 2018-11-21
Payer: MEDICARE

## 2018-11-27 ENCOUNTER — APPOINTMENT (OUTPATIENT)
Dept: PHYSICAL THERAPY | Age: 73
End: 2018-11-27
Payer: MEDICARE

## 2018-11-29 ENCOUNTER — APPOINTMENT (OUTPATIENT)
Dept: PHYSICAL THERAPY | Age: 73
End: 2018-11-29
Payer: MEDICARE

## 2018-11-29 NOTE — PROGRESS NOTES
[address] [facility] AT [facility]  [address] [facility] [address] [place] [address],[address], Rupesh corbett, Katja Rojas  Phone [phone]  Fax  SUMMARY FOR PHYSICAL THERAPY          Patient Name: Yanira Ahmadi : 1945   Treatment/Medical Diagnosis: Other cervical disc degeneration, unspecified cervical region [M50.30]  Other cervical disc degeneration, cervicothoracic region [M50.33]  Other intervertebral disc degeneration, lumbosacral region [M51.37]  Unspecified osteoarthritis, unspecified site [M19.90]   Onset Date: chronic    Referral Source: Isabel Martini DO [facility] [place]): 10/5/18   Prior Hospitalization: See Medical History Provider #: [id number]   Prior Level of Function: IND, ambulates with SPC, lives alone   Comorbidities: Diabetes, arthritis, HBP   Medications: Verified on Patient Summary List   Visits from [facility]: 11 Missed Visits: 2       Key Functional Changes/Progress: Pt last seen on 18. Pt called and stated that he was in the hospital and going to a SNF following discharge. Please DC from PT at this time due to medical complications. Please see PN on 18 for final measurements. G-Codes (GP):  Not reassessed due to unplanned DC. Assessments/Recommendations: Other: DC pt in hospital   If you have any questions/comments please contact us directly at [phone]. Thank you for allowing us to assist in the care of your patient.     Therapist Signature: Corbin Bell DPT Date: 18   Reporting Period: 10/5/18-18 Time: 8:31 AM

## 2019-10-29 NOTE — PROGRESS NOTES
PT DAILY TREATMENT NOTE     Patient Name: Yanira Ahmadi  Date:2018  : 1945  [x]  Patient  Verified  Payor: [employer] / Plan: [place] MEDICARE PART B / Product Type: Medicare /    In JXSZ:[id number]   Out time:1242  Total Treatment Time (min): 57  Total Timed Codes (min): 42   1:1 Treatment Time (min): 42  Visit #: 9 of 10    Treatment Area:  Other cervical disc degeneration, unspecified cervical region [M50.30]  Other cervical disc degeneration, cervicothoracic region [M50.33]  Other intervertebral disc degeneration, lumbosacral region [M51.37]  Unspecified osteoarthritis, unspecified site [M19.90]    SUBJECTIVE  Pain Level (0-10 scale): 2/10    Any medication changes, allergies to medications, adverse drug reactions, diagnosis change, or new procedure performed?: [x] No    [] Yes (see summary sheet for update)  Subjective functional status/changes:   [x] No changes reported      OBJECTIVE  Modality rationale: decrease inflammation, decrease pain and increase tissue extensibility to improve the patients ability to perform functional mobility and improve activity  endurance     Min Type Additional Details    [] Estim: []Att   []Unatt  []TENS instruct                 []IFC  []Premod []NMES                       []Other:  []w/US   []w/ice   []w/heat  Position:  Location:    []  Traction: [] Cervical       []Lumbar                       [] Prone          []Supine                       []Intermittent   []Continuous Lbs:  [] before manual  [] after manual    []  Ultrasound: []Continuous   [] Pulsed                           []1MHz   []3MHz Location:  W/cm2:    []  Iontophoresis with dexamethasone         Location: [] Take home patch   [] In clinic   10 []  Ice     [x]  heat  []  Ice massage Position:SL   Location:QL mm    []  Vasopneumatic Device Pressure: [] lo [] med [] hi   Temp: [] lo [] med [] hi   [] Skin assessment post-treatment:  []intact []redness- no adverse reaction       []redness - adverse reaction:         42 min Therapeutic Exercise:  [] See flow sheet :   Rationale: increase strength to improve the patients ability to improve tolerance to standing       H min Manual Therapy:  DTM along LS spine and QL. TrP release    Rationale: decrease pain, increase ROM and increase tissue extensibility to improve tolerance to standing     x min Patient Education: [x] Review HEP    [] Progressed/Changed HEP based on:   [] positioning   [] body mechanics   [] transfers   [] heat/ice application        Other Objective/Functional Measures:  Held manual interventions to assess effects without it for reassessment next session. Pt unable to perform sit to stands in arm chair without using UE for assistance    Pain Level (0-10 scale) post treatment: 1    ASSESSMENT/Changes in Function:   Pt challenged with sit to stands from lower surface secondary to decreased B LE weakness. Held manual to assess effects without it due to no significant change in pain levels. Assess next session for PN vs. DC next session pending progress towards goals. Patient will continue to benefit from skilled PT services to modify and progress therapeutic interventions, address functional mobility deficits, address strength deficits and analyze and address soft tissue restrictions to attain remaining goals. Progress towards goals / Updated goals:  Reassess next session for PN next visit.  11/1/18        PLAN  [x]  Upgrade activities as tolerated     [x]  Continue plan of care  []  Update interventions per flow sheet       []  Discharge due to:_  [x]  Other:reassess for PN vs DC next session    Jose A Mccartney DPT 11/1/2018  316  PM none

## 2021-11-26 PROBLEM — J95.811 PNEUMOTHORAX AFTER BIOPSY: Status: ACTIVE | Noted: 2021-01-01

## 2022-03-12 PROBLEM — R19.7 DIARRHEA: Status: ACTIVE | Noted: 2022-01-01

## 2022-03-12 PROBLEM — N17.9 ACUTE KIDNEY INJURY (HCC): Status: ACTIVE | Noted: 2022-01-01

## 2022-03-12 PROBLEM — R11.2 NAUSEA AND VOMITING: Status: ACTIVE | Noted: 2022-01-01

## 2022-03-12 PROBLEM — E86.0 DEHYDRATION: Status: ACTIVE | Noted: 2022-01-01

## 2022-03-13 PROBLEM — E43 SEVERE PROTEIN-CALORIE MALNUTRITION (HCC): Status: ACTIVE | Noted: 2022-01-01
